# Patient Record
Sex: FEMALE | Race: AMERICAN INDIAN OR ALASKA NATIVE
[De-identification: names, ages, dates, MRNs, and addresses within clinical notes are randomized per-mention and may not be internally consistent; named-entity substitution may affect disease eponyms.]

---

## 2017-04-11 ENCOUNTER — HOSPITAL ENCOUNTER (EMERGENCY)
Dept: HOSPITAL 5 - ED | Age: 32
LOS: 1 days | Discharge: LEFT BEFORE DISCHARGE | End: 2017-04-12
Payer: OTHER GOVERNMENT

## 2017-04-11 VITALS — SYSTOLIC BLOOD PRESSURE: 113 MMHG | DIASTOLIC BLOOD PRESSURE: 80 MMHG

## 2017-04-11 DIAGNOSIS — Z53.21: ICD-10-CM

## 2017-04-11 DIAGNOSIS — M25.551: Primary | ICD-10-CM

## 2017-04-12 NOTE — XRAY REPORT
Right hip 2 views.



History: Pain after trauma.



Findings: There are no fractures or other acute findings. Calcification 

seen adjacent to the femoral head, also noted on the previous study and 

August of 2016. This may be related to old trauma or bursitis.



Impression: No acute findings.

## 2018-01-01 ENCOUNTER — HOSPITAL ENCOUNTER (EMERGENCY)
Dept: HOSPITAL 5 - ED | Age: 33
Discharge: HOME | End: 2018-01-01
Payer: OTHER GOVERNMENT

## 2018-01-01 VITALS — DIASTOLIC BLOOD PRESSURE: 63 MMHG | SYSTOLIC BLOOD PRESSURE: 107 MMHG

## 2018-01-01 DIAGNOSIS — M25.551: Primary | ICD-10-CM

## 2018-01-01 DIAGNOSIS — G89.29: ICD-10-CM

## 2018-01-01 DIAGNOSIS — F17.200: ICD-10-CM

## 2018-01-01 DIAGNOSIS — Z88.6: ICD-10-CM

## 2018-01-01 PROCEDURE — 99284 EMERGENCY DEPT VISIT MOD MDM: CPT

## 2018-01-01 NOTE — XRAY REPORT
FINAL REPORT



EXAM:  XR HIP 2-3V RT



HISTORY:  fall, pain 



TECHNIQUE:  Right hip and AP pelvis 



PRIORS:  None.



FINDINGS:  

No fracture identified. No dislocation seen. Femoral head

maintains a normal contour.  Joint spaces within normal limits. 

Adjacent bony pelvis is unremarkable. Periarticular soft tissue

calcification noted superior margin of the right hip. 



Two like a shin clips are noted in the pelvis 



IMPRESSION:  

Periarticular calcifications noted superior margin of the right

hip could be degenerative in nature. 



Status post tubal ligation 



No acute traumatic abnormality identified

## 2018-01-01 NOTE — EMERGENCY DEPARTMENT REPORT
ED Lower Extremity HPI





- General


Chief Complaint: Back Pain/Injury


Stated Complaint: HIP PAIN


Time Seen by Provider: 01/01/18 19:03


Source: patient


Mode of arrival: Ambulatory


Limitations: No Limitations





- History of Present Illness


Initial Comments: 


32-year-old female past medical history right hip dysplasia, right hip 

osteoarthritis presents with complaint of acute on chronic right hip pain.  

Patient states she accidentally fell out of a chair earlier this afternoon.  

Patient is ambulatory but state that her right hip aches.  Patient is awake 

alert and oriented 3 but is in an irritated mood.  States that she has an 

orthopedic physician she follows up with, has had multiple cortisone shots. 


-: This afternoon


Injury: Hip: Right


Place: home


Severity: moderate


Severity scale (0 -10): 6


Worsens With: weight bearing


Context: fall





- Related Data


 Previous Rx's











 Medication  Instructions  Recorded  Last Taken  Type


 


Acetaminophen/Codeine [Tylenol #3] 1 tab PO Q4HR PRN #20 tablet 08/13/16 

Unknown Rx


 


Acetaminophen/Codeine [Tylenol 1 tab PO Q6H PRN #12 tab 01/01/18 Unknown Rx





/Codeine # 3 tab]    











 Allergies











Allergy/AdvReac Type Severity Reaction Status Date / Time


 


buspirone HCl [From BuSpar] Allergy  Hives Verified 04/11/17 22:17


 


hydrocodone Allergy  Itching Verified 04/11/17 22:18














ED Review of Systems


ROS: 


Stated complaint: HIP PAIN


Other details as noted in HPI





Constitutional: denies: chills, fever


Eyes: denies: eye pain, eye discharge, vision change


ENT: denies: ear pain, throat pain


Respiratory: denies: cough, shortness of breath, wheezing


Cardiovascular: denies: chest pain, palpitations


Endocrine: no symptoms reported


Gastrointestinal: denies: abdominal pain, nausea, diarrhea


Genitourinary: denies: urgency, dysuria, discharge


Musculoskeletal: as per HPI, arthralgia (chronic right hip pain).  denies: back 

pain, joint swelling


Skin: denies: rash, lesions


Neurological: denies: headache, weakness, paresthesias


Psychiatric: denies: anxiety, depression


Hematological/Lymphatic: denies: easy bleeding, easy bruising





ED Past Medical Hx





- Past Medical History


Additional medical history: States "stomach issues".  anxiety





- Surgical History


Additional Surgical History: Right hip surgery Nov. 2015.





- Social History


Smoking Status: Current Every Day Smoker





- Medications


Home Medications: 


 Home Medications











 Medication  Instructions  Recorded  Confirmed  Last Taken  Type


 


Acetaminophen/Codeine [Tylenol #3] 1 tab PO Q4HR PRN #20 tablet 08/13/16  

Unknown Rx


 


Acetaminophen/Codeine [Tylenol 1 tab PO Q6H PRN #12 tab 01/01/18  Unknown Rx





/Codeine # 3 tab]     














ED Physical Exam





- General


Limitations: No Limitations


General appearance: alert, in no apparent distress





- Head


Head exam: Present: atraumatic, normocephalic





- Eye


Eye exam: Present: normal appearance, PERRL, EOMI





- ENT


ENT exam: Present: mucous membranes moist





- Neck


Neck exam: Present: normal inspection





- Respiratory


Respiratory exam: Present: normal lung sounds bilaterally.  Absent: respiratory 

distress





- Cardiovascular


Cardiovascular Exam: Present: regular rate, normal rhythm.  Absent: systolic 

murmur, diastolic murmur, rubs, gallop





- GI/Abdominal


GI/Abdominal exam: Present: soft, normal bowel sounds





- Extremities Exam


Extremities exam: Present: normal inspection





- Expanded Lower Extremity Exam


  ** Right


Hip exam: Present: normal inspection, full ROM (hip flexion/extnesion painful 

but preserved, internal and external rotation intact)


Upper Leg exam: Present: normal inspection, full ROM


Knee exam: Present: normal inspection, full ROM


Lower Leg exam: Present: normal inspection, full ROM


Ankle exam: Present: normal inspection, full ROM


Foot/Toe exam: Present: normal inspection, full ROM


Neuro vascular tendon exam: Present: no vascular compromise (distal pulses 

intact)





- Back Exam


Back exam: Present: normal inspection





- Neurological Exam


Neurological exam: Present: alert, oriented X3, CN II-XII intact





- Psychiatric


Psychiatric exam: Present: normal affect, normal mood





- Skin


Skin exam: Present: warm, dry, intact, normal color.  Absent: rash





ED Course


 Vital Signs











  01/01/18





  16:48


 


Temperature 98.4 F


 


Pulse Rate 66


 


Respiratory 20





Rate 


 


Blood Pressure 107/63


 


O2 Sat by Pulse 99





Oximetry 














ED Lower Extremity MDM





- Medical Decision Making


A/P: Acute on chronic right hip pain


1-patient referred to pain management and states she has orthopedic follow-up 

in 4 days with her long-term with ortho physician


2-patient ambulatory with walker


3-short course Tylenol 3, patient does not want hydrocodone states tramadol 

does not work for her


Critical care attestation.: 


If time is entered above; I have spent that time in minutes in the direct care 

of this critically ill patient, excluding procedure time.








ED Disposition


Clinical Impression: 


 Right hip pain





Disposition: DC-01 TO HOME OR SELFCARE


Is pt being admited?: No


Does the pt Need Aspirin: No


Condition: Stable


Instructions:  Hip Sprain (ED), Arthralgia (ED)


Prescriptions: 


Acetaminophen/Codeine [Tylenol /Codeine # 3 tab] 1 tab PO Q6H PRN #12 tab


 PRN Reason: Pain


Referrals: 


[facility] [Provider Group] - 3-5 Days


Forms:  Accompanied Note, Work/School Release Form(ED)


Time of Disposition: 19:23

## 2019-03-17 ENCOUNTER — HOSPITAL ENCOUNTER (EMERGENCY)
Dept: HOSPITAL 5 - ED | Age: 34
Discharge: HOME | End: 2019-03-17
Payer: MEDICAID

## 2019-03-17 VITALS — DIASTOLIC BLOOD PRESSURE: 76 MMHG | SYSTOLIC BLOOD PRESSURE: 110 MMHG

## 2019-03-17 DIAGNOSIS — F12.10: ICD-10-CM

## 2019-03-17 DIAGNOSIS — J02.9: Primary | ICD-10-CM

## 2019-03-17 DIAGNOSIS — Z88.1: ICD-10-CM

## 2019-03-17 DIAGNOSIS — F17.200: ICD-10-CM

## 2019-03-17 DIAGNOSIS — Z88.4: ICD-10-CM

## 2019-03-17 LAB
BACTERIA #/AREA URNS HPF: (no result) /HPF
BILIRUB UR QL STRIP: (no result)
BLOOD UR QL VISUAL: (no result)
MUCOUS THREADS #/AREA URNS HPF: (no result) /HPF
PH UR STRIP: 6 [PH] (ref 5–7)
PROT UR STRIP-MCNC: (no result) MG/DL
RBC #/AREA URNS HPF: 3 /HPF (ref 0–6)
UROBILINOGEN UR-MCNC: 2 MG/DL (ref ?–2)
WBC #/AREA URNS HPF: 2 /HPF (ref 0–6)

## 2019-03-17 PROCEDURE — 87116 MYCOBACTERIA CULTURE: CPT

## 2019-03-17 PROCEDURE — 81025 URINE PREGNANCY TEST: CPT

## 2019-03-17 PROCEDURE — 81001 URINALYSIS AUTO W/SCOPE: CPT

## 2019-03-17 PROCEDURE — 87430 STREP A AG IA: CPT

## 2019-03-17 NOTE — EMERGENCY DEPARTMENT REPORT
ED ENT HPI





- General


Chief complaint: Sore Throat


Stated complaint: SORE THROAT/ EAR PAIN/ FEVER


Time Seen by Provider: 03/17/19 11:16


Source: patient


Mode of arrival: Ambulatory


Limitations: No Limitations





- History of Present Illness


Initial comments: 





This is a 33-year-old female nontoxic, well nourished in appearance, no acute 

signs of distress presents to the ED with c/o of sore throat. Patient describes 

sore throat as swallowing razer blades.  Patient denies any fever, chills, 

headache, stiff neck, nausea, vomiting, chest pain, shortness of breath, 

numbness or tingling. Patient denies any drooling or hoarseness.  Patient stated

allergies to hydrocodone and buspirone HCL.


MD complaint: sore throat


-: days(s)


Location: throat


Severity: mild


Severity scale (0 -10): 8


Quality: aching


Consistency: constant


Improves with: none


Worsens with: swallowing


Associated Symptoms: pain with swallowing, sore throat.  denies: fever, cough, 

gum swelling, toothache, tinnitus, hearing loss, discharge from ear, rhinorrhea





- Related Data


                                  Previous Rx's











 Medication  Instructions  Recorded  Last Taken  Type


 


Acetaminophen/Codeine [Tylenol #3] 1 tab PO Q4HR PRN #20 tablet 08/13/16 Unknown

Rx


 


Acetaminophen/Codeine [Tylenol 1 tab PO Q6H PRN #12 tab 01/01/18 Unknown Rx





/Codeine # 3 tab]    


 


Amoxicillin [Amoxicillin TAB] 875 mg PO BID #20 tablet 03/17/19 Unknown Rx


 


Ibuprofen [Motrin] 600 mg PO Q8H PRN #20 tablet 03/17/19 Unknown Rx


 


Nystas/Diphen/Xyl Visc/Mylanta 15 ml MM Q6H PRN 5 Days  ml 03/17/19 Unknown Rx





[Magic Mouthwash]    











                                    Allergies











Allergy/AdvReac Type Severity Reaction Status Date / Time


 


buspirone HCl [From BuSpar] Allergy  Hives Verified 04/11/17 22:17


 


hydrocodone Allergy  Itching Verified 04/11/17 22:18














ED Dental HPI





- General


Chief complaint: Sore Throat


Stated complaint: SORE THROAT/ EAR PAIN/ FEVER


Time Seen by Provider: 03/17/19 11:16


Source: patient


Mode of arrival: Ambulatory


Limitations: No Limitations





- Related Data


                                  Previous Rx's











 Medication  Instructions  Recorded  Last Taken  Type


 


Acetaminophen/Codeine [Tylenol #3] 1 tab PO Q4HR PRN #20 tablet 08/13/16 Unknown

Rx


 


Acetaminophen/Codeine [Tylenol 1 tab PO Q6H PRN #12 tab 01/01/18 Unknown Rx





/Codeine # 3 tab]    


 


Amoxicillin [Amoxicillin TAB] 875 mg PO BID #20 tablet 03/17/19 Unknown Rx


 


Ibuprofen [Motrin] 600 mg PO Q8H PRN #20 tablet 03/17/19 Unknown Rx


 


Nystas/Diphen/Xyl Visc/Mylanta 15 ml MM Q6H PRN 5 Days  ml 03/17/19 Unknown Rx





[Magic Mouthwash]    











                                    Allergies











Allergy/AdvReac Type Severity Reaction Status Date / Time


 


buspirone HCl [From BuSpar] Allergy  Hives Verified 04/11/17 22:17


 


hydrocodone Allergy  Itching Verified 04/11/17 22:18














ED Review of Systems


ROS: 


Stated complaint: SORE THROAT/ EAR PAIN/ FEVER


Other details as noted in HPI





Constitutional: denies: chills, fever


Eyes: denies: eye pain, eye discharge, vision change


ENT: throat pain.  denies: ear pain


Respiratory: denies: cough, shortness of breath, wheezing


Cardiovascular: denies: chest pain, palpitations


Endocrine: no symptoms reported


Gastrointestinal: denies: abdominal pain, nausea, diarrhea


Genitourinary: denies: urgency, dysuria, discharge


Musculoskeletal: denies: back pain, joint swelling, arthralgia


Skin: denies: rash, lesions


Neurological: denies: headache, weakness, paresthesias


Psychiatric: denies: anxiety, depression


Hematological/Lymphatic: denies: easy bleeding, easy bruising





ED Past Medical Hx





- Past Medical History


Previous Medical History?: Yes


Additional medical history: States "stomach issues".  anxiety





- Surgical History


Past Surgical History?: Yes


Additional Surgical History: Right hip surgery Nov. 2015.





- Social History


Smoking Status: Current Every Day Smoker


Substance Use Type: Alcohol, Marijuana





- Medications


Home Medications: 


                                Home Medications











 Medication  Instructions  Recorded  Confirmed  Last Taken  Type


 


Acetaminophen/Codeine [Tylenol #3] 1 tab PO Q4HR PRN #20 tablet 08/13/16  

Unknown Rx


 


Acetaminophen/Codeine [Tylenol 1 tab PO Q6H PRN #12 tab 01/01/18  Unknown Rx





/Codeine # 3 tab]     


 


Amoxicillin [Amoxicillin TAB] 875 mg PO BID #20 tablet 03/17/19  Unknown Rx


 


Ibuprofen [Motrin] 600 mg PO Q8H PRN #20 tablet 03/17/19  Unknown Rx


 


Nystas/Diphen/Xyl Visc/Mylanta 15 ml MM Q6H PRN 5 Days  ml 03/17/19  Unknown Rx





[Magic Mouthwash]     














ED Physical Exam





- General


Limitations: No Limitations


General appearance: alert, in no apparent distress





- Head


Head exam: Present: atraumatic, normocephalic





- Expanded ENT Exam


  ** Expanded


Ear exam: Present: normal external inspection


Mouth exam: Present: normal external inspection.  Absent: drooling, trismus, 

muffled voice


Teeth exam: Present: normal inspection


Throat exam: Positive: tonsillar erythema, tonsillomegaly (2+), other (uvula 

midline.).  Negative: tonsillar exudate, R peritonsillar mass, L peritonsillar 

mass





- Neck


Neck exam: Present: normal inspection, full ROM.  Absent: tenderness, 

meningismus, lymphadenopathy





- Extremities Exam


Extremities exam: Present: normal inspection, full ROM





- Back Exam


Back exam: Present: normal inspection, full ROM





- Neurological Exam


Neurological exam: Present: alert, oriented X3





- Psychiatric


Psychiatric exam: Present: normal affect, normal mood





- Skin


Skin exam: Present: warm, dry, intact, normal color.  Absent: rash





ED Course





                                   Vital Signs











  03/17/19





  11:14


 


Temperature 99.2 F


 


Pulse Rate 83


 


Respiratory 18





Rate 


 


Blood Pressure 110/76


 


O2 Sat by Pulse 100





Oximetry 














- Reevaluation(s)


Reevaluation #1: 





03/17/19 12:08


Patient is speaking in full sentences with no signs of distress noted.


Critical care attestation.: 


If time is entered above; I have spent that time in minutes in the direct care 

of this critically ill patient, excluding procedure time.








ED Disposition


Clinical Impression: 


Pharyngitis


Qualifiers:


 Pharyngitis/tonsillitis etiology: unspecified etiology Qualified Code(s): J02.9

- Acute pharyngitis, unspecified





Disposition: DC-01 TO HOME OR SELFCARE


Is pt being admited?: No


Does the pt Need Aspirin: No


Condition: Stable


Instructions:  Pharyngitis (ED)


Additional Instructions: 


Follow-up with a primary care doctor in 3-5 days or if symptoms worsen and 

continue return to emergency room as soon as possible. 


Prescriptions: 


Amoxicillin [Amoxicillin TAB] 875 mg PO BID #20 tablet


Nystas/Diphen/Xyl Visc/Mylanta [Magic Mouthwash] 15 ml MM Q6H PRN 5 Days  ml


 PRN Reason: Sore Throat


Ibuprofen [Motrin] 600 mg PO Q8H PRN #20 tablet


 PRN Reason: Pain


Referrals: 


[facility] Days


PRIMARY CARE,MD [Referring] - 3-5 Days


JOSE L ACOSTA MD [Staff Physician] - 3-5 Days


[facility] [Outside] - 3-5 Days


[facility] [Outside] - 3-5 Days


Forms:  Work/School Release Form(ED)

## 2020-08-19 ENCOUNTER — HOSPITAL ENCOUNTER (EMERGENCY)
Dept: HOSPITAL 5 - ED | Age: 35
LOS: 1 days | Discharge: HOME | End: 2020-08-20
Payer: SELF-PAY
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
## 2020-08-19 DIAGNOSIS — Z79.899: ICD-10-CM

## 2020-08-19 DIAGNOSIS — S76.011A: Primary | ICD-10-CM

## 2020-08-19 DIAGNOSIS — Z98.890: ICD-10-CM

## 2020-08-19 DIAGNOSIS — F17.200: ICD-10-CM

## 2020-08-19 DIAGNOSIS — Z88.8: ICD-10-CM

## 2020-08-19 DIAGNOSIS — F41.9: ICD-10-CM
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
## 2020-08-19 DIAGNOSIS — M76.891: ICD-10-CM

## 2020-08-19 PROCEDURE — 99283 EMERGENCY DEPT VISIT LOW MDM: CPT

## 2020-08-20 VITALS — SYSTOLIC BLOOD PRESSURE: 128 MMHG | DIASTOLIC BLOOD PRESSURE: 91 MMHG

## 2020-08-20 NOTE — XRAY REPORT
RIGHT HIP, 2 VIEWS



INDICATION / CLINICAL INFORMATION:

hip pain.



COMPARISON:

1/1/2018



FINDINGS:

No significant osseous abnormality. No fracture or dislocation. There are prominent calcific densitie
s along the superior aspect of the right hip joint. These were present on the prior exam probably not
 appreciably changed. The appearance is most suggestive for calcific tendinitis.



Evidence of tubal ligation clips.



IMPRESSION:

1. Probable calcific tendinitis of the right hip. Left hip periarticular calcific densities are stabl
e compared with older radiographs.

2. No significant osseous abnormality.



Signer Name: Rosalie Jimenez MD 

Signed: 8/20/2020 4:10 AM

Workstation Name: [id number]

## 2020-08-20 NOTE — EMERGENCY DEPARTMENT REPORT
ED Lower Extremity HPI





- General


Chief Complaint: Extremity Problem,Nontraumatic


Stated Complaint: RT PELVIC/HIP PAIN


Time Seen by Provider: 20 03:48


Source: patient


Mode of arrival: Ambulatory


Limitations: No Limitations





- History of Present Illness


Initial Comments: 





This is a 34-year-old female nontoxic, well nourished in appearance, no acute si

gns of distress presents to the ED with c/o of acute on chronic left hip pain x 

several years.  Patient denies any recent injuries or trauma.  Patient denies 

any numbness, tingling, fever, chills, nausea, vomiting, chest pain, shortness 

of breath, headache, stiff neck.  Patient denies any joint swelling or joint 

redness.  Patient denies decreased range of motion.  Patient stated has 

decreased gait due to pain.  Patient stated allergies to hydrocodone and 

Buspirone.


MD Complaint: hip injury


-: year(s)


Injury: Hip: Left


Severity: mild


Severity scale (0 -10): 8


Improves With: immobilization


Worsens With: weight bearing, movement, palpation


Associated Symptoms: able to partially bear weight.  denies: snap/pop sensation,

swelling, numbness, tingling, unable to bear weight





- Related Data


                                  Previous Rx's











 Medication  Instructions  Recorded  Last Taken  Type


 


Acetaminophen/Codeine [Tylenol #3] 1 tab PO Q4HR PRN #20 tablet 16 Unknown

Rx


 


Acetaminophen/Codeine [Tylenol 1 tab PO Q6H PRN #12 tab 18 Unknown Rx





/Codeine # 3 tab]    


 


Amoxicillin [Amoxicillin TAB] 875 mg PO BID #20 tablet 19 Unknown Rx


 


Ibuprofen [Motrin] 600 mg PO Q8H PRN #20 tablet 19 Unknown Rx


 


Nystas/Diphen/Xyl Visc/Mylanta 15 ml MM Q6H PRN 5 Days  ml 19 Unknown Rx





[Magic Mouthwash]    


 


Naproxen 500 mg PO Q12H PRN #12 tablet 20 Unknown Rx











                                    Allergies











Allergy/AdvReac Type Severity Reaction Status Date / Time


 


buspirone HCl [From BuSpar] Allergy  Hives Verified 17 22:17


 


hydrocodone Allergy  Itching Verified 17 22:18














ED Review of Systems


ROS: 


Stated complaint: RT PELVIC/HIP PAIN


Other details as noted in HPI





Constitutional: denies: chills, fever


Eyes: denies: eye pain, eye discharge, vision change


ENT: denies: ear pain, throat pain


Respiratory: denies: cough, shortness of breath, wheezing


Cardiovascular: denies: chest pain, palpitations


Endocrine: no symptoms reported


Gastrointestinal: denies: abdominal pain, nausea, diarrhea


Genitourinary: denies: urgency, dysuria, discharge


Musculoskeletal: denies: back pain, joint swelling, arthralgia


Skin: denies: rash, lesions


Neurological: denies: headache, weakness, paresthesias


Psychiatric: denies: anxiety, depression


Hematological/Lymphatic: denies: easy bleeding, easy bruising





ED Past Medical Hx





- Past Medical History


Previous Medical History?: Yes


Additional medical history: States "stomach issues".  anxiety.  R hip dysplasia





- Surgical History


Additional Surgical History: Right hip surgery 2015.





- Social History


Smoking Status: Current Some Day Smoker


Substance Use Type: None





- Medications


Home Medications: 


                                Home Medications











 Medication  Instructions  Recorded  Confirmed  Last Taken  Type


 


Acetaminophen/Codeine [Tylenol #3] 1 tab PO Q4HR PRN #20 tablet 16  

Unknown Rx


 


Acetaminophen/Codeine [Tylenol 1 tab PO Q6H PRN #12 tab 18  Unknown Rx





/Codeine # 3 tab]     


 


Amoxicillin [Amoxicillin TAB] 875 mg PO BID #20 tablet 19  Unknown Rx


 


Ibuprofen [Motrin] 600 mg PO Q8H PRN #20 tablet 19  Unknown Rx


 


Nystas/Diphen/Xyl Visc/Mylanta 15 ml MM Q6H PRN 5 Days  ml 19  Unknown Rx





[Magic Mouthwash]     


 


Naproxen 500 mg PO Q12H PRN #12 tablet 20  Unknown Rx














ED Physical Exam





- General


Limitations: No Limitations


General appearance: alert, in no apparent distress





- Head


Head exam: Present: atraumatic, normocephalic





- Neck


Neck exam: Present: normal inspection, full ROM.  Absent: tenderness, 

meningismus, lymphadenopathy





- Extremities Exam


Extremities exam: Present: full ROM, tenderness, normal capillary refill.  

Absent: joint swelling, calf tenderness





- Expanded Lower Extremity Exam


  ** Left


Hip exam: Present: full ROM, tenderness, external rotation, internal rotation, 

pelvic stability.  Absent: swelling, abrasion, laceration, ecchymosis, 

deformity, crepidus, dislocation, erythema, shortening


Upper Leg exam: Present: normal inspection, full ROM.  Absent: tenderness, 

swelling


Knee exam: Present: normal inspection, full ROM.  Absent: tenderness, swelling


Lower Leg exam: Present: normal inspection, full ROM.  Absent: tenderness, 

swelling


Ankle exam: Present: normal inspection, full ROM.  Absent: tenderness, swelling


Foot/Toe exam: Present: normal inspection, full ROM.  Absent: tenderness, 

swelling


Neuro vascular tendon exam: Present: no vascular compromise


Gait: Positive: observed and limited by pain





- Back Exam


Back exam: Present: normal inspection, full ROM.  Absent: tenderness, CVA 

tenderness (R), CVA tenderness (L), muscle spasm, paraspinal tenderness, 

vertebral tenderness, rash noted





- Neurological Exam


Neurological exam: Present: alert, oriented X3





- Psychiatric


Psychiatric exam: Present: normal affect, normal mood





- Skin


Skin exam: Present: warm, dry, intact, normal color.  Absent: rash





ED Course





                                   Vital Signs











  20





  00:41


 


Temperature 98.1 F


 


Pulse Rate 94 H


 


Respiratory 18





Rate 


 


Blood Pressure 128/91


 


O2 Sat by Pulse 99





Oximetry 














- Reevaluation(s)


Reevaluation #1: 





20 04:55


Patient is speaking in full sentences with no signs of distress noted.





- Consultations


Consultation #1: 





20 04:58


Patient has been consulted with Dr. Batista about patient history, physical 

exam, and xray results agrees to discharge plan of care.





ED Lower Extremity MDM





- Radiology Data











Referring Physician: EASTON ROGERS


 


Patient Name: ARMEN NICOLE


 


Patient ID: [id number]


 


YOB: 1985


 


Sex: Female


 


Accession: [id number]


 


Report Date: 2020


 


Report Status: Finalized








[facility] [address] [place] [address] 

XRay Report Signed Patient: ARMEN NICOLE MR#: [id number] : 

1985 Acct:[id number] Age/Sex: 34 / F ADM Date: 20 Loc: ED 

Attending Dr: Ordering Physician: EASTON ROGERS NP Date of Service: 20 

Procedure(s): XR hip 2-3V RT Accession Number(s): [id number] cc: EASTON ROGERS NP 

Fluoro Time In Minutes: RIGHT HIP, 2 VIEWS INDICATION / CLINICAL INFORMATION: 

hip pain. COMPARISON: 2018 FINDINGS: No significant osseous abnormality. No 

fracture or dislocation. There are prominent calcific densities along the 

superior aspect of the right hip joint. These were present on the prior exam 

probably not appreciably changed. The appearance is most suggestive for calcific

tendinitis. Evidence of tubal ligation clips. IMPRESSION: 1. Probable calcific 

tendinitis of the right hip. Left hip periarticular calcific densities are 

stable compared with older radiographs. 2. No significant osseous abnormality. 

Signer Name: Rosalie Jimenez MD Signed: 2020 4:10 AM Workstation Name: 

[id number] Transcribed By:  Dictated By: Rosalie Jimenez MD 

Electronically Authenticated By: Rosalie Jimenez MD Signed Date/Time: 

20 DD/DT: 20 TD/TT: 





- Medical Decision Making





This is a 34-year-old female that presents with right hip strain.  Patient is 

stable and was examined by me.  I referred patient to an orthopedic doctor for 

further evaluation for possible MRI.  X-ray has been obtained and dictated by 

the radiologist.  Patient is notified of the x-ray report with noted by the 

patient.  Patient does have normal gait with no tenderness and no joint 

swelling.  No ecchymosis. no joint redness or swelling. Not warm to touch. No 

signs of cellulites present. Patient was instructed to RICE therapy.  Patient 

received Norco for pain and stated patient family member will drive patient home

after discharge.  Stated pain is well controlled prior to discharge.  Patient is

discharged with Naproxen. At time of discharge, the patient does not seem toxic 

or ill in appearance.  No acute signs of distress noted.  Patient agrees to 

discharge treatment plan of care.  No further questions noted by the patient.


Critical care attestation.: 


If time is entered above; I have spent that time in minutes in the direct care 

of this critically ill patient, excluding procedure time.








ED Disposition


Clinical Impression: 


 Right hip tendinitis





Strain of right hip


Qualifiers:


 Encounter type: initial encounter Qualified Code(s): S76.011A - Strain of 

muscle, fascia and tendon of right hip, initial encounter





Disposition: - TO HOME OR SELFCARE


Is pt being admited?: No


Does the pt Need Aspirin: No


Condition: Stable


Instructions:  RICE Therapy (ED)


Additional Instructions: 


Follow-up with a orthopedic doctor in 3-5 days or if symptoms worsen and 

continue return to emergency room as soon as possible. 





No physical activity that extremity until cleared by orthopedic doctor


Prescriptions: 


Naproxen 500 mg PO Q12H PRN #12 tablet


 PRN Reason: Pain , Severe (7-10)


Referrals: 


PRIMARY CARE,MD [Primary Care Provider] - 3-5 Days


ADAM BENNETT MD [Staff Physician] - 3-5 Days


Forms:  Work/School Release Form(ED)


Time of Disposition: 05:00

## 2022-04-16 ENCOUNTER — HOSPITAL ENCOUNTER (EMERGENCY)
Dept: HOSPITAL 5 - ED | Age: 37
Discharge: LEFT BEFORE DISCHARGE | End: 2022-04-16
Payer: SELF-PAY

## 2022-04-16 VITALS — DIASTOLIC BLOOD PRESSURE: 80 MMHG | SYSTOLIC BLOOD PRESSURE: 120 MMHG

## 2022-04-16 DIAGNOSIS — H92.02: Primary | ICD-10-CM

## 2022-04-16 DIAGNOSIS — Z53.21: ICD-10-CM
